# Patient Record
Sex: MALE | Race: WHITE | NOT HISPANIC OR LATINO | Employment: FULL TIME | URBAN - METROPOLITAN AREA
[De-identification: names, ages, dates, MRNs, and addresses within clinical notes are randomized per-mention and may not be internally consistent; named-entity substitution may affect disease eponyms.]

---

## 2022-07-26 ENCOUNTER — HOSPITAL ENCOUNTER (EMERGENCY)
Facility: MEDICAL CENTER | Age: 57
End: 2022-07-26
Attending: EMERGENCY MEDICINE
Payer: MEDICARE

## 2022-07-26 VITALS
HEART RATE: 76 BPM | BODY MASS INDEX: 27.52 KG/M2 | SYSTOLIC BLOOD PRESSURE: 127 MMHG | DIASTOLIC BLOOD PRESSURE: 80 MMHG | TEMPERATURE: 98 F | HEIGHT: 75 IN | OXYGEN SATURATION: 98 % | RESPIRATION RATE: 18 BRPM | WEIGHT: 221.34 LBS

## 2022-07-26 DIAGNOSIS — R00.2 PALPITATIONS: ICD-10-CM

## 2022-07-26 DIAGNOSIS — D64.9 ANEMIA, UNSPECIFIED TYPE: ICD-10-CM

## 2022-07-26 DIAGNOSIS — F41.8 SITUATIONAL ANXIETY: ICD-10-CM

## 2022-07-26 LAB
ALBUMIN SERPL BCP-MCNC: 4.1 G/DL (ref 3.2–4.9)
ALBUMIN/GLOB SERPL: 1.4 G/DL
ALP SERPL-CCNC: 69 U/L (ref 30–99)
ALT SERPL-CCNC: 27 U/L (ref 2–50)
ANION GAP SERPL CALC-SCNC: 10 MMOL/L (ref 7–16)
AST SERPL-CCNC: 26 U/L (ref 12–45)
BASOPHILS # BLD AUTO: 0.7 % (ref 0–1.8)
BASOPHILS # BLD: 0.04 K/UL (ref 0–0.12)
BILIRUB SERPL-MCNC: 0.2 MG/DL (ref 0.1–1.5)
BUN SERPL-MCNC: 25 MG/DL (ref 8–22)
CALCIUM SERPL-MCNC: 8.9 MG/DL (ref 8.4–10.2)
CHLORIDE SERPL-SCNC: 101 MMOL/L (ref 96–112)
CO2 SERPL-SCNC: 25 MMOL/L (ref 20–33)
CREAT SERPL-MCNC: 0.96 MG/DL (ref 0.5–1.4)
EKG IMPRESSION: NORMAL
EOSINOPHIL # BLD AUTO: 0.18 K/UL (ref 0–0.51)
EOSINOPHIL NFR BLD: 3.3 % (ref 0–6.9)
ERYTHROCYTE [DISTWIDTH] IN BLOOD BY AUTOMATED COUNT: 47.1 FL (ref 35.9–50)
GFR SERPLBLD CREATININE-BSD FMLA CKD-EPI: 92 ML/MIN/1.73 M 2
GLOBULIN SER CALC-MCNC: 2.9 G/DL (ref 1.9–3.5)
GLUCOSE SERPL-MCNC: 90 MG/DL (ref 65–99)
HCT VFR BLD AUTO: 36.4 % (ref 42–52)
HGB BLD-MCNC: 12.7 G/DL (ref 14–18)
IMM GRANULOCYTES # BLD AUTO: 0.01 K/UL (ref 0–0.11)
IMM GRANULOCYTES NFR BLD AUTO: 0.2 % (ref 0–0.9)
INR PPP: 2.01 (ref 0.87–1.13)
LYMPHOCYTES # BLD AUTO: 2.55 K/UL (ref 1–4.8)
LYMPHOCYTES NFR BLD: 46.8 % (ref 22–41)
MCH RBC QN AUTO: 34 PG (ref 27–33)
MCHC RBC AUTO-ENTMCNC: 34.9 G/DL (ref 33.7–35.3)
MCV RBC AUTO: 97.6 FL (ref 81.4–97.8)
MONOCYTES # BLD AUTO: 0.42 K/UL (ref 0–0.85)
MONOCYTES NFR BLD AUTO: 7.7 % (ref 0–13.4)
NEUTROPHILS # BLD AUTO: 2.25 K/UL (ref 1.82–7.42)
NEUTROPHILS NFR BLD: 41.3 % (ref 44–72)
NRBC # BLD AUTO: 0 K/UL
NRBC BLD-RTO: 0 /100 WBC
PLATELET # BLD AUTO: 173 K/UL (ref 164–446)
PMV BLD AUTO: 9.9 FL (ref 9–12.9)
POTASSIUM SERPL-SCNC: 3.8 MMOL/L (ref 3.6–5.5)
PROT SERPL-MCNC: 7 G/DL (ref 6–8.2)
PROTHROMBIN TIME: 21.7 SEC (ref 12–14.6)
RBC # BLD AUTO: 3.73 M/UL (ref 4.7–6.1)
SODIUM SERPL-SCNC: 136 MMOL/L (ref 135–145)
TROPONIN T SERPL-MCNC: 12 NG/L (ref 6–19)
WBC # BLD AUTO: 5.5 K/UL (ref 4.8–10.8)

## 2022-07-26 PROCEDURE — 36415 COLL VENOUS BLD VENIPUNCTURE: CPT

## 2022-07-26 PROCEDURE — A9270 NON-COVERED ITEM OR SERVICE: HCPCS | Performed by: EMERGENCY MEDICINE

## 2022-07-26 PROCEDURE — 85610 PROTHROMBIN TIME: CPT

## 2022-07-26 PROCEDURE — 700102 HCHG RX REV CODE 250 W/ 637 OVERRIDE(OP): Performed by: EMERGENCY MEDICINE

## 2022-07-26 PROCEDURE — 93005 ELECTROCARDIOGRAM TRACING: CPT | Performed by: EMERGENCY MEDICINE

## 2022-07-26 PROCEDURE — 93005 ELECTROCARDIOGRAM TRACING: CPT

## 2022-07-26 PROCEDURE — 85025 COMPLETE CBC W/AUTO DIFF WBC: CPT

## 2022-07-26 PROCEDURE — 94760 N-INVAS EAR/PLS OXIMETRY 1: CPT

## 2022-07-26 PROCEDURE — 84484 ASSAY OF TROPONIN QUANT: CPT

## 2022-07-26 PROCEDURE — 80053 COMPREHEN METABOLIC PANEL: CPT

## 2022-07-26 PROCEDURE — 99284 EMERGENCY DEPT VISIT MOD MDM: CPT

## 2022-07-26 RX ORDER — LORAZEPAM 1 MG/1
1 TABLET ORAL ONCE
Status: COMPLETED | OUTPATIENT
Start: 2022-07-26 | End: 2022-07-26

## 2022-07-26 RX ORDER — CYCLOBENZAPRINE HCL 10 MG
10 TABLET ORAL PRN
COMMUNITY

## 2022-07-26 RX ADMIN — LORAZEPAM 1 MG: 1 TABLET ORAL at 22:05

## 2022-07-27 NOTE — ED NOTES
PT  Ambulated to bed 7B along side aunt  Pt c/o an episode of Vtach while working out approx 1500 pt was rowing  Pt endorses the episode lasted approx 15sec and resolved pg has had multiple shocks and an ablation done for this   Pt is a/ox4 no distress noted breathing is even & unlabored pt resting in bed w/ no distress

## 2022-07-27 NOTE — DISCHARGE INSTRUCTIONS
Please follow-up with your heart doctor upon return home.  For dizziness, chest pain, severe palpitation, any concerns go to the nearest emergency department for evaluation.  Please avoid exertion until cleared to resume normal activity by your doctor

## 2022-07-27 NOTE — ED PROVIDER NOTES
"ED Provider Note    CHIEF COMPLAINT  Chief Complaint   Patient presents with   • Irregular Heart Beat     Patient report of \"irregular heart beat like my previous V-tach episode while exercising around 5 pm\"  felt weak, focus on his breathing and felt anxious.  No chest pain.  Feels light lightheaded.         HPI  Go Kelly is a 57 y.o. male who presents after 32nd run of palpitations and dizziness while working out.  Patient states it felt like runs of V. tach, something he was diagnosed with 4 years ago with subsequent ablation.  Patient has history of mechanical heart valve, takes Coumadin, states he has been compliant with his medication.  No bleeding.  After discussion, he has been mildly anemic recently.  He states his doctor has been ordering test on him regarding this.  He states it is possible he is dehydrated.  No chest pain, no shortness of breath.  Currently he states he feels well other than feeling anxious.  Patient states the thought of having to go through cardiac ablation again makes him feel anxious.  Patient is visiting from [facility], states his physicians are there.  Patient states her discussions of placing ICD however with success of ablation procedure, he states this was never placed.    REVIEW OF SYSTEMS    Constitutional: Generalized weakness following heart palpitation  Respiratory: No shortness of breath  Cardiac: Palpitations, no chest pain.  History of cardiac valve replacement, states history of ventricular tachycardia  Gastrointestinal: No abdominal pain, no vomiting  Musculoskeletal: No acute neck or back pain  Neurologic: Denies headache       All other systems are negative.       PAST MEDICAL HISTORY  Past Medical History:   Diagnosis Date   • Heart failure (HCC)    • HIV (human immunodeficiency virus infection)        FAMILY HISTORY  History reviewed. No pertinent family history.    SOCIAL HISTORY  Social History     Socioeconomic History   • Marital status: " "Single   Tobacco Use   • Smoking status: Never Smoker   • Smokeless tobacco: Never Used   Substance and Sexual Activity   • Alcohol use: Yes     Comment: very occassionally   • Drug use: No       SURGICAL HISTORY  Past Surgical History:   Procedure Laterality Date   • OTHER CARDIAC SURGERY      aortic valve replaced   • OTHER ORTHOPEDIC SURGERY      total toe amputation bilat feet       CURRENT MEDICATIONS  Home Medications     Reviewed by Ashley Silva R.N. (Registered Nurse) on 07/26/22 at 1950  Med List Status: Complete   Medication Last Dose Status   Abacavir-Dolutegravir-Lamivud (TRIUMEQ PO) 7/25/2022 Active   ACYCLOVIR 7/25/2022 Active   ATIVAN PO 7/25/2022 Active   Warfarin Sodium (MD ALERT...WARFARIN PER PHYSICIAN) 7/25/2022 Active                ALLERGIES  No Active Allergies    PHYSICAL EXAM  VITAL SIGNS: /79   Pulse 79   Temp 36.4 °C (97.5 °F) (Temporal)   Resp 18   Ht 1.905 m (6' 3\")   Wt 100 kg (221 lb 5.5 oz)   SpO2 97%   BMI 27.67 kg/m²   Constitutional:  Non-toxic appearance.   HENT: No facial swelling  Eyes: Anicteric, no conjunctivitis.     Cardiovascular: Normal heart rate, Normal rhythm.  Occasional PACs on the monitor  Pulmonary:  No wheezing, No rales.  Good bilateral air movement  Gastrointestinal: Soft, No tenderness, No distention  Skin: Warm, Dry, No cyanosis.  No asymmetric edema  Neurologic: Speech is clear, follows commands, facial expression is symmetrical.  Strength intact  Psychiatric: Affect normal,  Mood normal.  Patient is calm and cooperative  Musculoskeletal: No chest wall tenderness    EKG/Labs  Results for orders placed or performed during the hospital encounter of 07/26/22   CBC WITH DIFFERENTIAL   Result Value Ref Range    WBC 5.5 4.8 - 10.8 K/uL    RBC 3.73 (L) 4.70 - 6.10 M/uL    Hemoglobin 12.7 (L) 14.0 - 18.0 g/dL    Hematocrit 36.4 (L) 42.0 - 52.0 %    MCV 97.6 81.4 - 97.8 fL    MCH 34.0 (H) 27.0 - 33.0 pg    MCHC 34.9 33.7 - 35.3 g/dL    RDW 47.1 35.9 - " 50.0 fL    Platelet Count 173 164 - 446 K/uL    MPV 9.9 9.0 - 12.9 fL    Neutrophils-Polys 41.30 (L) 44.00 - 72.00 %    Lymphocytes 46.80 (H) 22.00 - 41.00 %    Monocytes 7.70 0.00 - 13.40 %    Eosinophils 3.30 0.00 - 6.90 %    Basophils 0.70 0.00 - 1.80 %    Immature Granulocytes 0.20 0.00 - 0.90 %    Nucleated RBC 0.00 /100 WBC    Neutrophils (Absolute) 2.25 1.82 - 7.42 K/uL    Lymphs (Absolute) 2.55 1.00 - 4.80 K/uL    Monos (Absolute) 0.42 0.00 - 0.85 K/uL    Eos (Absolute) 0.18 0.00 - 0.51 K/uL    Baso (Absolute) 0.04 0.00 - 0.12 K/uL    Immature Granulocytes (abs) 0.01 0.00 - 0.11 K/uL    NRBC (Absolute) 0.00 K/uL   COMP METABOLIC PANEL   Result Value Ref Range    Sodium 136 135 - 145 mmol/L    Potassium 3.8 3.6 - 5.5 mmol/L    Chloride 101 96 - 112 mmol/L    Co2 25 20 - 33 mmol/L    Anion Gap 10.0 7.0 - 16.0    Glucose 90 65 - 99 mg/dL    Bun 25 (H) 8 - 22 mg/dL    Creatinine 0.96 0.50 - 1.40 mg/dL    Calcium 8.9 8.4 - 10.2 mg/dL    AST(SGOT) 26 12 - 45 U/L    ALT(SGPT) 27 2 - 50 U/L    Alkaline Phosphatase 69 30 - 99 U/L    Total Bilirubin 0.2 0.1 - 1.5 mg/dL    Albumin 4.1 3.2 - 4.9 g/dL    Total Protein 7.0 6.0 - 8.2 g/dL    Globulin 2.9 1.9 - 3.5 g/dL    A-G Ratio 1.4 g/dL   TROPONIN   Result Value Ref Range    Troponin T 12 6 - 19 ng/L   PROTHROMBIN TIME (INR)   Result Value Ref Range    PT 21.7 (H) 12.0 - 14.6 sec    INR 2.01 (H) 0.87 - 1.13   ESTIMATED GFR   Result Value Ref Range    GFR (CKD-EPI) 92 >60 mL/min/1.73 m 2   EKG   Result Value Ref Range    Report       [facility] Emergency Dept.    Test Date:  2022  Pt Name:    INGA FARLEY                 Department: EDS  MRN:        [id number]                      Room:  Gender:     Male                         Technician: KYAW  :        1965                   Requested By:ER TRIAGE PROTOCOL  Order #:    [id number]                    Reading MD: SAMI DENT MD    Measurements  Intervals                                 Axis  Rate:       83                           P:          43  MT:         152                          QRS:        -77  QRSD:       172                          T:          66  QT:         452  QTc:        532    Interpretive Statements  SINUS RHYTHM  PROBABLE LEFT ATRIAL ABNORMALITY  RBBB AND LAFB  Compared to ECG 06/17/2008 14:32:51  Incomplete right bundle-branch block no longer present  no ischemic change  Electronically Signed On 7- 23:10:47 PDT by ANDREW DENT MD             COURSE & MEDICAL DECISION MAKING  Pertinent Labs & Imaging studies reviewed. (See chart for details)  With clear breath sounds, normal pulse oximetry, no respiratory distress or cough, chest x-ray was not obtained after discussion with the patient.  EKG shows a sinus rhythm, no ischemic change.  Patient has mild anemia with hemoglobin of 12.7, he states he was aware of this and is working with his physician regarding cause.  Patient states he has had negative Hemoccult stool studies.  Patient's Coumadin INR is 2.01.  Patient had slight increase in BUN, this may indicate some level of dehydration.  Patient is well-appearing at this time with normal vital signs.  30 second episode of palpitations is of unknown etiology, likely cardiac arrhythmia.  He is encouraged to follow-up with his cardiologist upon return home.  It is recommended he avoid exertion until cleared to resume normal activity by his cardiologist.  Troponin returned negative.  Electrolyte balance normal.      Patient stated he has felt anxious since the palpitations, stating he is worried he may need an ablation again.  After discussion, patient was given dose of Ativan for his anxiety.    FINAL IMPRESSION     1. Palpitations     2. Situational anxiety     3. Anemia, unspecified type                     Electronically signed by: Andrew Dent M.D., 7/26/2022 10:31 PM

## 2022-07-27 NOTE — ED TRIAGE NOTES
"57 yr old male to triage  Chief Complaint   Patient presents with   • Irregular Heart Beat     Patient report of \"irregular heart beat like my previous V-tach episode while exercising around 5 pm\"  felt weak, focus on his breathing and felt anxious.  No chest pain.  Feels light lightheaded.       /75   Pulse 83   Temp 36.4 °C (97.5 °F) (Temporal)   Resp 18   Ht 1.905 m (6' 3\")   Wt 100 kg (221 lb 5.5 oz)   SpO2 95%   BMI 27.67 kg/m²     "

## 2022-07-27 NOTE — ED NOTES
Pt cleared for DC home pts aunt to drive pt home no s/s of acute distress pt verbalized understanding & has no further questions or concerns pt is in stable condition breathing is even & unlabored no distress noted pt to return to closes ED for any worsening/conserning symptoms pt advised to f/u with pcp pt and family ambulated out of ED w.steady gait